# Patient Record
Sex: MALE | Race: ASIAN | NOT HISPANIC OR LATINO | Employment: FULL TIME | URBAN - METROPOLITAN AREA
[De-identification: names, ages, dates, MRNs, and addresses within clinical notes are randomized per-mention and may not be internally consistent; named-entity substitution may affect disease eponyms.]

---

## 2023-02-24 ENCOUNTER — OFFICE VISIT (OUTPATIENT)
Dept: FAMILY MEDICINE CLINIC | Facility: CLINIC | Age: 33
End: 2023-02-24
Payer: COMMERCIAL

## 2023-02-24 VITALS
WEIGHT: 211.2 LBS | BODY MASS INDEX: 31.28 KG/M2 | DIASTOLIC BLOOD PRESSURE: 91 MMHG | OXYGEN SATURATION: 95 % | SYSTOLIC BLOOD PRESSURE: 128 MMHG | TEMPERATURE: 98.6 F | HEIGHT: 69 IN | HEART RATE: 91 BPM | RESPIRATION RATE: 16 BRPM

## 2023-02-24 DIAGNOSIS — Z00.00 ENCOUNTER FOR MEDICAL EXAMINATION TO ESTABLISH CARE: Primary | ICD-10-CM

## 2023-02-24 DIAGNOSIS — G89.29 NECK PAIN, CHRONIC: ICD-10-CM

## 2023-02-24 DIAGNOSIS — Z00.00 ANNUAL PHYSICAL EXAM: ICD-10-CM

## 2023-02-24 DIAGNOSIS — M54.9 MID BACK PAIN: ICD-10-CM

## 2023-02-24 DIAGNOSIS — R53.1 WEAKNESS: ICD-10-CM

## 2023-02-24 DIAGNOSIS — R03.0 ELEVATED BLOOD PRESSURE READING WITHOUT DIAGNOSIS OF HYPERTENSION: ICD-10-CM

## 2023-02-24 DIAGNOSIS — M54.2 NECK PAIN, CHRONIC: ICD-10-CM

## 2023-02-24 DIAGNOSIS — E66.09 CLASS 1 OBESITY DUE TO EXCESS CALORIES WITHOUT SERIOUS COMORBIDITY WITH BODY MASS INDEX (BMI) OF 31.0 TO 31.9 IN ADULT: ICD-10-CM

## 2023-02-24 PROCEDURE — 99385 PREV VISIT NEW AGE 18-39: CPT | Performed by: STUDENT IN AN ORGANIZED HEALTH CARE EDUCATION/TRAINING PROGRAM

## 2023-02-24 PROCEDURE — 99214 OFFICE O/P EST MOD 30 MIN: CPT | Performed by: STUDENT IN AN ORGANIZED HEALTH CARE EDUCATION/TRAINING PROGRAM

## 2023-02-24 RX ORDER — CYCLOBENZAPRINE HCL 5 MG
5 TABLET ORAL 3 TIMES DAILY PRN
Qty: 15 TABLET | Refills: 0 | Status: SHIPPED | OUTPATIENT
Start: 2023-02-24

## 2023-02-28 LAB
25(OH)D3+25(OH)D2 SERPL-MCNC: 17.4 NG/ML (ref 30–100)
ALBUMIN SERPL-MCNC: 4.6 G/DL (ref 3.5–5.2)
ALBUMIN/GLOB SERPL: 1.7 G/DL
ALP SERPL-CCNC: 77 U/L (ref 39–117)
ALT SERPL-CCNC: 50 U/L (ref 1–41)
APPEARANCE UR: CLEAR
AST SERPL-CCNC: 33 U/L (ref 1–40)
BACTERIA #/AREA URNS HPF: NORMAL /HPF
BASOPHILS # BLD AUTO: 0.03 10*3/MM3 (ref 0–0.2)
BASOPHILS NFR BLD AUTO: 0.5 % (ref 0–1.5)
BILIRUB SERPL-MCNC: 0.5 MG/DL (ref 0–1.2)
BILIRUB UR QL STRIP: NEGATIVE
BUN SERPL-MCNC: 9 MG/DL (ref 6–20)
BUN/CREAT SERPL: 10.3 (ref 7–25)
CALCIUM SERPL-MCNC: 9.4 MG/DL (ref 8.6–10.5)
CASTS URNS MICRO: NORMAL
CHLORIDE SERPL-SCNC: 100 MMOL/L (ref 98–107)
CHOLEST SERPL-MCNC: 202 MG/DL (ref 0–200)
CHOLEST/HDLC SERPL: 4.81 {RATIO}
CO2 SERPL-SCNC: 26.6 MMOL/L (ref 22–29)
COLOR UR: YELLOW
CREAT SERPL-MCNC: 0.87 MG/DL (ref 0.76–1.27)
EGFRCR SERPLBLD CKD-EPI 2021: 116.8 ML/MIN/1.73
EOSINOPHIL # BLD AUTO: 0.26 10*3/MM3 (ref 0–0.4)
EOSINOPHIL NFR BLD AUTO: 4.1 % (ref 0.3–6.2)
EPI CELLS #/AREA URNS HPF: NORMAL /HPF
ERYTHROCYTE [DISTWIDTH] IN BLOOD BY AUTOMATED COUNT: 12.3 % (ref 12.3–15.4)
FOLATE SERPL-MCNC: 6.3 NG/ML (ref 4.78–24.2)
GLOBULIN SER CALC-MCNC: 2.7 GM/DL
GLUCOSE SERPL-MCNC: 92 MG/DL (ref 65–99)
GLUCOSE UR QL STRIP: NEGATIVE
HCT VFR BLD AUTO: 42.4 % (ref 37.5–51)
HDLC SERPL-MCNC: 42 MG/DL (ref 40–60)
HGB BLD-MCNC: 14.3 G/DL (ref 13–17.7)
HGB UR QL STRIP: NEGATIVE
IMM GRANULOCYTES # BLD AUTO: 0.02 10*3/MM3 (ref 0–0.05)
IMM GRANULOCYTES NFR BLD AUTO: 0.3 % (ref 0–0.5)
KETONES UR QL STRIP: NEGATIVE
LDLC SERPL CALC-MCNC: 125 MG/DL (ref 0–100)
LEUKOCYTE ESTERASE UR QL STRIP: NEGATIVE
LYMPHOCYTES # BLD AUTO: 2.34 10*3/MM3 (ref 0.7–3.1)
LYMPHOCYTES NFR BLD AUTO: 36.5 % (ref 19.6–45.3)
MCH RBC QN AUTO: 30.7 PG (ref 26.6–33)
MCHC RBC AUTO-ENTMCNC: 33.7 G/DL (ref 31.5–35.7)
MCV RBC AUTO: 91 FL (ref 79–97)
MONOCYTES # BLD AUTO: 0.57 10*3/MM3 (ref 0.1–0.9)
MONOCYTES NFR BLD AUTO: 8.9 % (ref 5–12)
NEUTROPHILS # BLD AUTO: 3.19 10*3/MM3 (ref 1.7–7)
NEUTROPHILS NFR BLD AUTO: 49.7 % (ref 42.7–76)
NITRITE UR QL STRIP: NEGATIVE
NRBC BLD AUTO-RTO: 0 /100 WBC (ref 0–0.2)
PH UR STRIP: 6 [PH] (ref 5–8)
PLATELET # BLD AUTO: 268 10*3/MM3 (ref 140–450)
POTASSIUM SERPL-SCNC: 4.2 MMOL/L (ref 3.5–5.2)
PROT SERPL-MCNC: 7.3 G/DL (ref 6–8.5)
PROT UR QL STRIP: NEGATIVE
RBC # BLD AUTO: 4.66 10*6/MM3 (ref 4.14–5.8)
RBC #/AREA URNS HPF: NORMAL /HPF
SODIUM SERPL-SCNC: 138 MMOL/L (ref 136–145)
SP GR UR STRIP: 1.01 (ref 1–1.03)
TRIGL SERPL-MCNC: 196 MG/DL (ref 0–150)
TSH SERPL DL<=0.005 MIU/L-ACNC: 3.93 UIU/ML (ref 0.27–4.2)
UROBILINOGEN UR STRIP-MCNC: NORMAL MG/DL
VIT B12 SERPL-MCNC: 258 PG/ML (ref 211–946)
VLDLC SERPL CALC-MCNC: 35 MG/DL (ref 5–40)
WBC # BLD AUTO: 6.41 10*3/MM3 (ref 3.4–10.8)
WBC #/AREA URNS HPF: NORMAL /HPF

## 2023-02-28 NOTE — PROGRESS NOTES
"  Chief Complaint  Pt presented to clinic with   Chief Complaint   Patient presents with   • Establish Care          History of Present Illness  Mr. Garcia 32-year-old male who presented to the clinic for the first time for establishing medical care and for annual physical exam.  Patient complained of generalized weakness chronic neck pain and mid back pain.  Patient stated pain is continuous in nature and is not associated with any exacerbated or relieving factors.  Review of Systems   Constitutional: Positive for fatigue. Negative for appetite change and unexpected weight change.   HENT: Negative for congestion, rhinorrhea, sore throat, trouble swallowing and voice change.    Respiratory: Negative for chest tightness, shortness of breath and wheezing.    Cardiovascular: Negative for chest pain and palpitations.   Gastrointestinal: Negative for abdominal distention, abdominal pain, diarrhea, nausea and vomiting.   Genitourinary: Negative for decreased urine volume and flank pain.   Musculoskeletal: Positive for back pain and neck pain. Negative for arthralgias and myalgias.   Skin: Negative for wound.   Neurological: Negative for tremors, weakness and headaches.   Hematological: Negative for adenopathy.   Psychiatric/Behavioral: Negative for dysphoric mood and sleep disturbance.       PMH:   No outpatient medications prior to visit.     No facility-administered medications prior to visit.      No Known Allergies  History reviewed. No pertinent surgical history.  family history includes Cancer in his maternal uncle; Hypertension in his father.   reports that he has been smoking cigarettes. He has a 5.00 pack-year smoking history. He has never used smokeless tobacco. He reports current alcohol use of about 3.0 standard drinks per week. He reports that he does not use drugs.     /91   Pulse 91   Temp 98.6 °F (37 °C)   Resp 16   Ht 175.3 cm (69.02\")   Wt 95.8 kg (211 lb 3.2 oz)   SpO2 95%   BMI 31.17 kg/m² "   Physical Exam  HENT:      Head: Normocephalic and atraumatic.      Mouth/Throat:      Mouth: Mucous membranes are moist.      Pharynx: Oropharynx is clear.   Eyes:      Extraocular Movements: Extraocular movements intact.      Conjunctiva/sclera: Conjunctivae normal.      Pupils: Pupils are equal, round, and reactive to light.   Cardiovascular:      Rate and Rhythm: Normal rate and regular rhythm.   Pulmonary:      Effort: Pulmonary effort is normal.      Breath sounds: Normal breath sounds.   Abdominal:      General: Bowel sounds are normal.      Palpations: Abdomen is soft.   Musculoskeletal:         General: Normal range of motion.      Cervical back: Neck supple.   Skin:     General: Skin is warm.      Capillary Refill: Capillary refill takes less than 2 seconds.   Neurological:      General: No focal deficit present.      Mental Status: He is alert and oriented to person, place, and time. Mental status is at baseline.   Psychiatric:         Mood and Affect: Mood normal.          Diagnoses and all orders for this visit:    1. Encounter for medical examination to establish care (Primary)  -     Cancel: CBC Auto Differential  -     Cancel: Comprehensive Metabolic Panel  -     Cancel: Lipid Panel With / Chol / HDL Ratio  -     Cancel: TSH    2. Annual physical exam  -     Cancel: CBC Auto Differential  -     Cancel: Comprehensive Metabolic Panel  -     Cancel: Lipid Panel With / Chol / HDL Ratio  -     Cancel: TSH    3. Class 1 obesity due to excess calories without serious comorbidity with body mass index (BMI) of 31.0 to 31.9 in adult  -     CBC Auto Differential  -     Comprehensive Metabolic Panel  -     Lipid Panel With / Chol / HDL Ratio  -     TSH  -     Urinalysis With Microscopic - Urine, Clean Catch    4. Weakness  -     Vitamin B12  -     Folate  -     Vitamin D 25 hydroxy    5. Neck pain, chronic  -     CBC Auto Differential  -     Comprehensive Metabolic Panel  -     Lipid Panel With / Chol / HDL  Ratio  -     TSH  -     Urinalysis With Microscopic - Urine, Clean Catch  -     cyclobenzaprine (FLEXERIL) 5 MG tablet; Take 1 tablet by mouth 3 (Three) Times a Day As Needed for Muscle Spasms.  Dispense: 15 tablet; Refill: 0    6. Mid back pain  -     CBC Auto Differential  -     Comprehensive Metabolic Panel  -     Lipid Panel With / Chol / HDL Ratio  -     TSH  -     Urinalysis With Microscopic - Urine, Clean Catch  -     cyclobenzaprine (FLEXERIL) 5 MG tablet; Take 1 tablet by mouth 3 (Three) Times a Day As Needed for Muscle Spasms.  Dispense: 15 tablet; Refill: 0    7. Elevated blood pressure reading without diagnosis of hypertension    Other orders  -     CBC & Differential  -     Microscopic Examination -    Annual physical exam  Fasting labs have been ordered, will follow up    For generalized weakness  Vitamin D level ordered along with vitamin B12 and folate    For neck pain  Advised patient to do daily range of motion exercise and can take Flexeril as needed for neck pain and mid back pain    Blood pressure is on the higher side, advised patient to continue BP monitoring at home if possible otherwise patient need to come back in a month for recheck of the blood pressure and its management  Recommended the following for better blood pressure management: maintain low sodium diet (< 3 grams), lose weight , exercise regularly, minimizealcohol , manage stress better and decrease caffeine      Needs yearly Flu vaccine  Dental visit and exam every year  Seat Belt always when driving or riding cars  Safe sex life to avoid STD  Healthy heart diet  Exercise 3 times a week    Follow Up  1 months   Mich Nix(NP)

## 2023-03-23 ENCOUNTER — TELEPHONE (OUTPATIENT)
Dept: FAMILY MEDICINE CLINIC | Facility: CLINIC | Age: 33
End: 2023-03-23
Payer: COMMERCIAL

## 2023-03-24 ENCOUNTER — TELEPHONE (OUTPATIENT)
Dept: FAMILY MEDICINE CLINIC | Facility: CLINIC | Age: 33
End: 2023-03-24

## 2023-03-24 NOTE — TELEPHONE ENCOUNTER
[email or web address]                 subject: attn kinza nielson called on behalf of patient and stated we coded labs wrong, instead of routine it was coded otherwise, that the patient was charged. wants to know how we can fix as [facility] cant since they are our codes. the email is the contact info to [facility] rep      I would call [facility] and have them bill z00.00 as the primary code.

## 2023-03-27 NOTE — TELEPHONE ENCOUNTER
I called [facility] billing and fixed the coding.  They're resubmitting it  under Z00.00.  Patient can discard this bill and a new billing cycle will take up to 45 days

## 2023-04-21 ENCOUNTER — TELEPHONE (OUTPATIENT)
Dept: FAMILY MEDICINE CLINIC | Facility: CLINIC | Age: 33
End: 2023-04-21

## 2023-04-25 ENCOUNTER — TELEPHONE (OUTPATIENT)
Dept: FAMILY MEDICINE CLINIC | Facility: CLINIC | Age: 33
End: 2023-04-25

## 2023-04-25 NOTE — TELEPHONE ENCOUNTER
Caller: ARELY    Relationship:     Best call back number:[phone]    What is the best time to reach you: ANY    Who are you requesting to speak with (clinical staff, provider,  specific staff member):      Do you know the name of the person who called: ARELY YTAES    What was the call regarding: RETURNING CALL BACK AFTER CALL WAS DISCONNECTED WITH MYLENE OR [facility].      Do you require a callback: YES

## 2023-04-25 NOTE — TELEPHONE ENCOUNTER
Caller: BIANCA    Relationship: [employer]    Best call back number: [phone]    Who are you requesting to speak with (clinical staff, provider,  specific staff member): CLINICAL    What was the call regarding: BIANCA WITH [employer] STATES PATIENT'S LAST LAB APPOINTMENT WAS NOT CODED RIGHT WITH A ROUTINE DIAGNOSIS AND IS NEEDING IT UPDATED.         
HUB TO READ: Called [facility] back twice and each time they disconnected the call.  I called [facility] and added diagnostic codes- Z00.00 as the primary and R82.9. I will call the patient to have him discard the bill and wait for the next billing cycle.  
No